# Patient Record
Sex: FEMALE | URBAN - METROPOLITAN AREA
[De-identification: names, ages, dates, MRNs, and addresses within clinical notes are randomized per-mention and may not be internally consistent; named-entity substitution may affect disease eponyms.]

---

## 2018-08-03 ENCOUNTER — POST-OPERATIVE VISIT (OUTPATIENT)
Dept: URBAN - METROPOLITAN AREA CLINIC 17 | Facility: CLINIC | Age: 43
End: 2018-08-03

## 2018-08-03 ASSESSMENT — INTRAOCULAR PRESSURE: OD: 10

## 2018-08-13 ENCOUNTER — POST-OPERATIVE VISIT (OUTPATIENT)
Dept: URBAN - METROPOLITAN AREA CLINIC 23 | Facility: CLINIC | Age: 43
End: 2018-08-13

## 2018-08-13 DIAGNOSIS — Z09 ENCNTR FOR F/U EXAM AFT TRTMT FOR COND OTH THAN MALIG NEOPLM: Primary | ICD-10-CM

## 2018-11-05 ENCOUNTER — OFFICE VISIT (OUTPATIENT)
Dept: URBAN - METROPOLITAN AREA CLINIC 23 | Facility: CLINIC | Age: 43
End: 2018-11-05
Payer: COMMERCIAL

## 2018-11-05 DIAGNOSIS — H10.432 CHRONIC FOLLICULAR CONJUNCTIVITIS, LEFT EYE: ICD-10-CM

## 2018-11-05 PROCEDURE — 99214 OFFICE O/P EST MOD 30 MIN: CPT | Performed by: OPHTHALMOLOGY

## 2018-11-05 RX ORDER — FLUOROMETHOLONE 2.5 MG/ML
0.25 % SUSPENSION/ DROPS OPHTHALMIC
Qty: 10 | Refills: 0 | Status: INACTIVE
Start: 2018-11-05 | End: 2018-11-07

## 2018-11-05 ASSESSMENT — INTRAOCULAR PRESSURE: OD: 13

## 2018-11-05 NOTE — IMPRESSION/PLAN
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 Plan: Discussed diagnosis in detail with patient. Discussed treatment options with patient. Discharge will be treated w/FML TID OS. Follow up with Dr. Roberto Castro for prosthesis fitting. F/U w/ Dr. Quita Segovia 3 months.

## 2018-11-12 ENCOUNTER — OFFICE VISIT (OUTPATIENT)
Dept: URBAN - METROPOLITAN AREA CLINIC 17 | Facility: CLINIC | Age: 43
End: 2018-11-12
Payer: COMMERCIAL

## 2018-11-12 DIAGNOSIS — Z90.01 ACQUIRED ABSENCE OF EYE: Primary | ICD-10-CM

## 2018-11-12 PROCEDURE — V2627 SCLERAL COVER SHELL: HCPCS | Performed by: OPTOMETRIST

## 2018-11-12 PROCEDURE — 99213 OFFICE O/P EST LOW 20 MIN: CPT | Performed by: OPTOMETRIST

## 2018-11-12 PROCEDURE — V2628 FABRICATION & FITTING: HCPCS | Performed by: OPTOMETRIST

## 2018-11-12 RX ORDER — LOTEPREDNOL ETABONATE 5 MG/ML
0.5 % SUSPENSION/ DROPS OPHTHALMIC
Qty: 1 | Refills: 3 | Status: INACTIVE
Start: 2018-11-12 | End: 2018-11-14

## 2018-11-12 ASSESSMENT — INTRAOCULAR PRESSURE: OD: 23

## 2018-11-12 NOTE — IMPRESSION/PLAN
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 Plan: Patient needs a prosthetic eye due to the shrinking socket and atrophy of lids and surrounding tissue. Recommend a scleral shell with the possibliity of socket and lid expansion with progressively larger prosthetic eyes.  disp mod conformer, needs shell 1B 11.0 2020 3.50 RTC 2 months pRDS

## 2019-01-07 ENCOUNTER — OFFICE VISIT (OUTPATIENT)
Dept: URBAN - METROPOLITAN AREA CLINIC 17 | Facility: CLINIC | Age: 44
End: 2019-01-07
Payer: COMMERCIAL

## 2019-01-07 PROCEDURE — 99213 OFFICE O/P EST LOW 20 MIN: CPT | Performed by: OPTOMETRIST

## 2019-01-07 ASSESSMENT — INTRAOCULAR PRESSURE: OD: 22

## 2019-02-04 ENCOUNTER — OFFICE VISIT (OUTPATIENT)
Dept: URBAN - METROPOLITAN AREA CLINIC 17 | Facility: CLINIC | Age: 44
End: 2019-02-04
Payer: COMMERCIAL

## 2019-02-04 PROCEDURE — 99213 OFFICE O/P EST LOW 20 MIN: CPT | Performed by: OPTOMETRIST

## 2019-02-04 ASSESSMENT — INTRAOCULAR PRESSURE: OD: 21

## 2019-02-04 NOTE — IMPRESSION/PLAN
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  Plan: Pt happy with prosthesis, Pt ed on condition, AT PRN RTC 3 months PRFU

## 2019-02-04 NOTE — IMPRESSION/PLAN
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  Plan: Pt ed on condition, refer for eval with plastics

## 2019-04-01 ENCOUNTER — OFFICE VISIT (OUTPATIENT)
Dept: URBAN - METROPOLITAN AREA CLINIC 17 | Facility: CLINIC | Age: 44
End: 2019-04-01
Payer: COMMERCIAL

## 2019-04-01 DIAGNOSIS — H02.412 MECHANICAL PTOSIS OF LEFT EYELID: ICD-10-CM

## 2019-04-01 PROCEDURE — 99213 OFFICE O/P EST LOW 20 MIN: CPT | Performed by: OPTOMETRIST

## 2019-04-01 PROCEDURE — V2625 ENLARGEMNT OF EYE PROSTHESIS: HCPCS | Performed by: OPTOMETRIST

## 2019-04-01 ASSESSMENT — INTRAOCULAR PRESSURE: OD: 15

## 2019-04-01 NOTE — IMPRESSION/PLAN
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  Plan: socket changing, enlarged prosthesis, RTC 1 month for eval

## 2019-05-20 ENCOUNTER — OFFICE VISIT (OUTPATIENT)
Dept: URBAN - METROPOLITAN AREA CLINIC 17 | Facility: CLINIC | Age: 44
End: 2019-05-20
Payer: COMMERCIAL

## 2019-05-20 PROCEDURE — 99213 OFFICE O/P EST LOW 20 MIN: CPT | Performed by: OPTOMETRIST

## 2019-05-20 ASSESSMENT — INTRAOCULAR PRESSURE: OD: 23

## 2019-05-20 NOTE — IMPRESSION/PLAN
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  Plan: socket changing, enlarge prosthesis with 2 layers, RTC 1 month PRDS

## 2019-08-19 ENCOUNTER — OFFICE VISIT (OUTPATIENT)
Dept: URBAN - METROPOLITAN AREA CLINIC 17 | Facility: CLINIC | Age: 44
End: 2019-08-19
Payer: COMMERCIAL

## 2019-08-19 PROCEDURE — 99213 OFFICE O/P EST LOW 20 MIN: CPT | Performed by: OPTOMETRIST

## 2019-08-19 ASSESSMENT — INTRAOCULAR PRESSURE: OD: 21

## 2019-08-19 NOTE — IMPRESSION/PLAN
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  Plan: lids struggling with larger prosthesis, RTC 1 month PRFU, may need eval with plastics

## 2019-09-16 ENCOUNTER — OFFICE VISIT (OUTPATIENT)
Dept: URBAN - METROPOLITAN AREA CLINIC 17 | Facility: CLINIC | Age: 44
End: 2019-09-16
Payer: COMMERCIAL

## 2019-09-16 PROCEDURE — 99213 OFFICE O/P EST LOW 20 MIN: CPT | Performed by: OPTOMETRIST

## 2019-09-16 PROCEDURE — V2626 REDUCTION OF EYE PROSTHESIS: HCPCS | Performed by: OPTOMETRIST

## 2019-09-16 ASSESSMENT — INTRAOCULAR PRESSURE: OD: 21

## 2019-09-16 NOTE — IMPRESSION/PLAN
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  Plan: lids struggling with larger prosthesis, RTC 1 month PRFU, may need eval with plastics, reduced inf surface today

## 2019-10-14 ENCOUNTER — OFFICE VISIT (OUTPATIENT)
Dept: URBAN - METROPOLITAN AREA CLINIC 17 | Facility: CLINIC | Age: 44
End: 2019-10-14
Payer: COMMERCIAL

## 2019-10-14 DIAGNOSIS — Q11.1 OTHER ANOPHTHALMOS: ICD-10-CM

## 2019-10-14 PROCEDURE — V2626 REDUCTION OF EYE PROSTHESIS: HCPCS | Performed by: OPTOMETRIST

## 2019-10-14 PROCEDURE — 99213 OFFICE O/P EST LOW 20 MIN: CPT | Performed by: OPTOMETRIST

## 2019-10-14 ASSESSMENT — INTRAOCULAR PRESSURE: OD: 22

## 2019-10-14 NOTE — IMPRESSION/PLAN
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  Plan: lids struggling with larger prosthesis, RTC 1 month PRFU reduced inf surface today